# Patient Record
Sex: MALE | Race: BLACK OR AFRICAN AMERICAN | NOT HISPANIC OR LATINO | Employment: UNEMPLOYED | URBAN - METROPOLITAN AREA
[De-identification: names, ages, dates, MRNs, and addresses within clinical notes are randomized per-mention and may not be internally consistent; named-entity substitution may affect disease eponyms.]

---

## 2017-02-14 DIAGNOSIS — J45.40 MODERATE PERSISTENT ASTHMA WITHOUT COMPLICATION: ICD-10-CM

## 2017-02-16 DIAGNOSIS — J45.40 MODERATE PERSISTENT ASTHMA WITHOUT COMPLICATION: ICD-10-CM

## 2017-02-16 RX ORDER — ALBUTEROL SULFATE 90 UG/1
AEROSOL, METERED RESPIRATORY (INHALATION)
Qty: 2 INHALER | Refills: 3 | Status: SHIPPED | OUTPATIENT
Start: 2017-02-16 | End: 2017-04-26

## 2017-04-20 ENCOUNTER — OFFICE VISIT (OUTPATIENT)
Dept: FAMILY MEDICINE | Facility: CLINIC | Age: 52
End: 2017-04-20

## 2017-04-20 VITALS
WEIGHT: 208 LBS | DIASTOLIC BLOOD PRESSURE: 93 MMHG | HEART RATE: 56 BPM | SYSTOLIC BLOOD PRESSURE: 151 MMHG | OXYGEN SATURATION: 99 % | TEMPERATURE: 97.8 F | BODY MASS INDEX: 27.44 KG/M2

## 2017-04-20 DIAGNOSIS — M79.661 RIGHT CALF PAIN: ICD-10-CM

## 2017-04-20 DIAGNOSIS — M25.561 RIGHT KNEE PAIN, UNSPECIFIED CHRONICITY: Primary | ICD-10-CM

## 2017-04-20 RX ORDER — ACETAMINOPHEN 500 MG
1000 TABLET ORAL EVERY 6 HOURS PRN
Qty: 100 TABLET | Refills: 0 | Status: SHIPPED | OUTPATIENT
Start: 2017-04-20 | End: 2018-03-05

## 2017-04-20 RX ORDER — CYCLOBENZAPRINE HCL 10 MG
10 TABLET ORAL 2 TIMES DAILY PRN
Qty: 30 TABLET | Refills: 0 | Status: SHIPPED | OUTPATIENT
Start: 2017-04-20 | End: 2018-03-05

## 2017-04-20 NOTE — MR AVS SNAPSHOT
After Visit Summary   4/20/2017    Andriy Davila Jr.    MRN: [id number]           Patient Information     Date Of Birth          1965        Visit Information        Provider Department      4/20/2017 8:00 AM Mick Mcneal MD [facility]        Today's Diagnoses     Right knee pain, unspecified chronicity    -  1    Right calf pain          Care Instructions    ~ Recommend you to see a orthopedic specialist for your right knee and calf pain.     Referral for ( TEST )  :      Orthopedics  LOCATION/PLACE/Provider :    [facility]  [address] [place] [id number]  DATE & TIME :     May 1st, 2017 at 10:45am with Dr. Carbone   PHONE :     [phone]  FAX :     [phone]  ADDITIONAL INFORMATION :     NA  Appointment made by clinic staff/:    DOMINIQUE          Follow-ups after your visit        Additional Services     ORTHOPEDICS ADULT REFERRAL       Reason for Referral:  Right knee and calf pain without clear etiology for 1 month  Referral Location: [facility]: [phone]     needed: No  Language: English      ([facility] Only) Referral should be tracked (Yes/No)?  YES                  Who to contact     Please call your clinic at [phone] to:    Ask questions about your health    Make or cancel appointments    Discuss your medicines    Learn about your test results    Speak to your doctor   If you have compliments or concerns about an experience at your clinic, or if you wish to file a complaint, please contact [facility] Patient Relations at [phone] or email us at [email or web address]         Additional Information About Your Visit        Care EveryWhere ID     This is your Care EveryWhere ID. This could be used by other organizations to access your [place] medical records  [id number]        Your Vitals Were     Pulse Temperature Pulse Oximetry BMI (Body Mass Index)          56 97.8  F (36.6  C)  (Oral) 99% 27.44 kg/m2         Blood Pressure from Last 3 Encounters:   04/20/17 (!) 151/93   06/01/16 137/86   02/09/16 134/89    Weight from Last 3 Encounters:   04/20/17 208 lb (94.3 kg)   06/01/16 193 lb (87.5 kg)   02/09/16 197 lb 9.6 oz (89.6 kg)              We Performed the Following     ORTHOPEDICS ADULT REFERRAL          Today's Medication Changes          These changes are accurate as of: 4/20/17 10:28 AM.  If you have any questions, ask your nurse or doctor.               Start taking these medicines.        Dose/Directions    acetaminophen 500 MG tablet   Commonly known as:  TYLENOL   Used for:  Right knee pain, unspecified chronicity, Right calf pain   Started by:  Mick Mcneal MD        Dose:  1000 mg   Take 2 tablets (1,000 mg) by mouth every 6 hours as needed for mild pain   Quantity:  100 tablet   Refills:  0       cyclobenzaprine 10 MG tablet   Commonly known as:  FLEXERIL   Used for:  Right calf pain, Right knee pain, unspecified chronicity   Started by:  Mick Mcneal MD        Dose:  10 mg   Take 1 tablet (10 mg) by mouth 2 times daily as needed for muscle spasms   Quantity:  30 tablet   Refills:  0         Stop taking these medicines if you haven't already. Please contact your care team if you have questions.     buPROPion 100 MG 12 hr tablet   Commonly known as:  WELLBUTRIN SR   Stopped by:  Mick Mcneal MD           mirtazapine 15 MG tablet   Commonly known as:  REMERON   Stopped by:  Mick Mcneal MD           predniSONE 20 MG tablet   Commonly known as:  DELTASONE   Stopped by:  Mick Mcneal MD                Where to get your medicines      These medications were sent to [id number] - [place], [place] - [address] [place] [address] AT [address], [place] [address]     Phone:  [phone]     acetaminophen 500 MG tablet         Some of these will need a paper prescription and others can be bought over the counter.  Ask your nurse if you have  questions.     Bring a paper prescription for each of these medications     cyclobenzaprine 10 MG tablet                Primary Care Provider Office Phone # Fax #    Jay Strange [phone]       [facility] [address] [place] [address]        Thank you!     Thank you for choosing [facility]  for your care. Our goal is always to provide you with excellent care. Hearing back from our patients is one way we can continue to improve our services. Please take a few minutes to complete the written survey that you may receive in the mail after your visit with us. Thank you!             Your Updated Medication List - Protect others around you: Learn how to safely use, store and throw away your medicines at www.disposemymeds.org.          This list is accurate as of: 4/20/17 10:28 AM.  Always use your most recent med list.                   Brand Name Dispense Instructions for use    acetaminophen 500 MG tablet    TYLENOL    100 tablet    Take 2 tablets (1,000 mg) by mouth every 6 hours as needed for mild pain       albuterol 108 (90 BASE) MCG/ACT Inhaler    albuterol    2 Inhaler    Take 1-2 puffs       cetirizine 10 MG tablet    zyrTEC    30 tablet    Take 1 tablet (10 mg) by mouth daily       cyclobenzaprine 10 MG tablet    FLEXERIL    30 tablet    Take 1 tablet (10 mg) by mouth 2 times daily as needed for muscle spasms       fluticasone 50 MCG/ACT spray    FLONASE    16 g    Spray 2 sprays into both nostrils daily       fluticasone-salmeterol 250-50 MCG/DOSE diskus inhaler    ADVAIR    3 Inhaler    Inhale 1 puff into the lungs 2 times daily       olopatadine 0.1 % ophthalmic solution    PATANOL    5 mL    Place 1 drop into both eyes 2 times daily

## 2017-04-20 NOTE — PATIENT INSTRUCTIONS
~ Recommend you to see a orthopedic specialist for your right knee and calf pain.     Referral for ( TEST )  :      Orthopedics  LOCATION/PLACE/Provider :    [facility]  [address] [place] [address]  DATE & TIME :     May 1st, 2017 at 10:45am with Dr. Carbone   PHONE :     [phone]  FAX :     [phone]  ADDITIONAL INFORMATION :     NA  Appointment made by clinic staff/:    DOMINIQUE    6/12/17 f/u to [facility], pt no show appt on 5/1/17 and no future appt scheduled.  F/u to patient, voicemail only, letter sent. MARNIE Barclay

## 2017-04-20 NOTE — LETTER
June 12, 2017      Andriy Davila Jr  [address]  [place] [address]        Dear Andriy,    I am following up with you regarding your referral to see [facility] [phone].  Please call them to reschedule.  Let us know if there is anything we can assist you with.    Sincerely,    MD Juancho Soto ARCHANA

## 2017-04-20 NOTE — PROGRESS NOTES
SUBJECTIVE:  This is a 52-year-old male who I am meeting for the first time today.  He describes one month of his right knee and right calf pain.  No known injury.  He cannot recall when this exactly started.  Over the past month he has pain when he touches his calf or when he walks.  His knee pain is worse when he tries to bend his knee and he often finds himself holding the knee out in front of him flexed only about 20 degrees.  He has been walking and has not modified his activity other than holding his leg more extended when he is sitting.  He has been using ibuprofen 400 mg 4-5 times per day for the pain.  He has not had any redness, swelling, locking, popping or giving out.  He was hopeful that the pain will go away on its own.  He cannot recall having problems or pain in his right leg before.  Thinks he has had some left knee pain in the past.  In reviewing his chart he was here in 02/2016 with right knee pain and underwent plain film x-rays which were negative.  The provisional diagnosis at that time was possible meniscal injury and the recommendation was to return if his pain does not get better.  We reviewed this history and he assumes that the pain got better after that evaluation.   REVIEW OF SYSTEMS:  No fevers or chills.  No skin changes.  No calf swelling, no shortness of breath, no bruising or bleeding.   OBJECTIVE:   VITAL SIGNS:  As above, blood pressure is slightly above goal.   GENERAL:  He is alert and in no distress.   NEUROLOGIC:  He walks with an antalgic gait.    EXTREMITIES:  When he is sitting he holds his right leg mostly  extended flexed to only about 20-30 degrees.  He has normal muscle bulk and tone of the right lower extremity, he has normal sensation to light touch throughout the lower legs from the knee distally and there is no erythema or swelling of the knee, calf ankle or foot.  He has tenderness along both sides of the patellar tenderness to very light touch in the popliteal  fossa and all along the calf with some distraction the tenderness along the calf is variable.  He is able to dorsiflex and plantar flex the ankle with full range of motion and without pain.  He has pain with trying to fully extend the knee or trying to flex beyond 30 degrees.  He has pain with inversion or eversion of the foot up into the knee.  The left lower extremity is unremarkable.   ASSESSMENT AND PLAN:   1.  Right knee and calf pain:  This could be a meniscal injury with radiating pain versus a calf muscle injury, or achilles injury. This is not a common presentation of any of these conditions.  Deep venous thrombosis is very unlikely.  He has significant but variable tenderness to the calf but does not have pain at the achilles, and does not have pain with dorsiflexion, plantar flexion of the ankle, even against resistance.  At this point the diagnosis is unclear.  Certainly further imaging with MRI potentially could be helpful, but with unclear physical exam findings I think it may be most efficient to have a second opinion with the orthopedic surgeon/sports medicine group.  He has an established relationship with [facility] and has been seen for various musculoskeletal injuries in the past.  He will be evaluated there for a second opinion to see if imaging is necessary versus a therapy program or if a wait and watch approach would be reasonable.  He may use ibuprofen 600 mg up to 3 times a day with food, Tylenol 1000 mg up to 4 times a day and Flexeril 10 mg twice daily for no more than 14 days.  We discussed the potential side effects of these medications and in particular Flexeril, discussed the potential for sedation and not to drive on this medication and that he should not use this medication for more than 2 weeks.  Await second opinion for further recs.

## 2017-04-26 DIAGNOSIS — J45.40 MODERATE PERSISTENT ASTHMA WITHOUT COMPLICATION: ICD-10-CM

## 2017-04-26 DIAGNOSIS — Z91.09 ENVIRONMENTAL ALLERGIES: ICD-10-CM

## 2017-04-26 DIAGNOSIS — H10.13 ALLERGIC CONJUNCTIVITIS, BILATERAL: ICD-10-CM

## 2017-04-26 DIAGNOSIS — J30.2 ALLERGIC RHINITIS, SEASONAL: ICD-10-CM

## 2017-04-28 RX ORDER — FLUTICASONE PROPIONATE 50 MCG
2 SPRAY, SUSPENSION (ML) NASAL DAILY
Qty: 16 G | Refills: 3 | Status: SHIPPED | OUTPATIENT
Start: 2017-04-28 | End: 2021-12-08

## 2017-04-28 RX ORDER — ALBUTEROL SULFATE 90 UG/1
AEROSOL, METERED RESPIRATORY (INHALATION)
Qty: 2 INHALER | Refills: 1 | Status: SHIPPED | OUTPATIENT
Start: 2017-04-28 | End: 2018-03-05

## 2017-04-28 RX ORDER — OLOPATADINE HYDROCHLORIDE 1 MG/ML
1 SOLUTION/ DROPS OPHTHALMIC 2 TIMES DAILY
Qty: 5 ML | Refills: 3 | Status: SHIPPED | OUTPATIENT
Start: 2017-04-28 | End: 2021-12-08

## 2017-04-28 RX ORDER — CETIRIZINE HYDROCHLORIDE 10 MG/1
10 TABLET ORAL DAILY
Qty: 30 TABLET | Refills: 11 | Status: SHIPPED | OUTPATIENT
Start: 2017-04-28 | End: 2018-03-05

## 2018-03-05 ENCOUNTER — OFFICE VISIT (OUTPATIENT)
Dept: FAMILY MEDICINE | Facility: CLINIC | Age: 53
End: 2018-03-05
Payer: MEDICAID

## 2018-03-05 VITALS
WEIGHT: 217 LBS | HEIGHT: 74 IN | OXYGEN SATURATION: 100 % | DIASTOLIC BLOOD PRESSURE: 86 MMHG | SYSTOLIC BLOOD PRESSURE: 138 MMHG | BODY MASS INDEX: 27.85 KG/M2 | HEART RATE: 68 BPM | TEMPERATURE: 98 F

## 2018-03-05 DIAGNOSIS — L02.01 CUTANEOUS ABSCESS OF FACE: Primary | ICD-10-CM

## 2018-03-05 DIAGNOSIS — J45.40 MODERATE PERSISTENT ASTHMA WITHOUT COMPLICATION: ICD-10-CM

## 2018-03-05 DIAGNOSIS — N52.9 ERECTILE DYSFUNCTION, UNSPECIFIED ERECTILE DYSFUNCTION TYPE: ICD-10-CM

## 2018-03-05 DIAGNOSIS — Z91.09 ENVIRONMENTAL ALLERGIES: ICD-10-CM

## 2018-03-05 DIAGNOSIS — M25.561 RIGHT KNEE PAIN, UNSPECIFIED CHRONICITY: ICD-10-CM

## 2018-03-05 DIAGNOSIS — H10.13 ALLERGIC CONJUNCTIVITIS, BILATERAL: ICD-10-CM

## 2018-03-05 RX ORDER — OLOPATADINE HYDROCHLORIDE 1 MG/ML
1 SOLUTION/ DROPS OPHTHALMIC 2 TIMES DAILY
Qty: 5 ML | Refills: 3 | Status: CANCELLED | OUTPATIENT
Start: 2018-03-05

## 2018-03-05 RX ORDER — CEPHALEXIN 500 MG/1
500 CAPSULE ORAL 2 TIMES DAILY
Qty: 14 CAPSULE | Refills: 0 | Status: SHIPPED | OUTPATIENT
Start: 2018-03-05 | End: 2018-03-12

## 2018-03-05 RX ORDER — ACETAMINOPHEN 500 MG
1000 TABLET ORAL EVERY 6 HOURS PRN
Qty: 100 TABLET | Refills: 0 | Status: SHIPPED | OUTPATIENT
Start: 2018-03-05 | End: 2018-06-18

## 2018-03-05 RX ORDER — ALBUTEROL SULFATE 90 UG/1
AEROSOL, METERED RESPIRATORY (INHALATION)
Qty: 2 INHALER | Refills: 1 | Status: SHIPPED | OUTPATIENT
Start: 2018-03-05 | End: 2021-12-08

## 2018-03-05 RX ORDER — SILDENAFIL 50 MG/1
50 TABLET, FILM COATED ORAL DAILY PRN
Qty: 6 TABLET | Refills: 0 | Status: SHIPPED | OUTPATIENT
Start: 2018-03-05 | End: 2021-12-08

## 2018-03-05 RX ORDER — CETIRIZINE HYDROCHLORIDE 10 MG/1
10 TABLET ORAL DAILY
Qty: 30 TABLET | Refills: 11 | Status: SHIPPED | OUTPATIENT
Start: 2018-03-05 | End: 2021-12-08

## 2018-03-05 RX ORDER — FLUTICASONE PROPIONATE 50 MCG
2 SPRAY, SUSPENSION (ML) NASAL DAILY
Qty: 16 G | Refills: 3 | Status: CANCELLED | OUTPATIENT
Start: 2018-03-05

## 2018-03-05 NOTE — MR AVS SNAPSHOT
"              After Visit Summary   3/5/2018    Andriy Davila Jr.    MRN: [id number]           Patient Information     Date Of Birth          1965        Visit Information        Provider Department      3/5/2018 9:40 AM Mick Mcneal MD [facility]        Today's Diagnoses     Cutaneous abscess of face    -  1    Moderate persistent asthma without complication        Environmental allergies        Allergic conjunctivitis, bilateral        Right knee pain, unspecified chronicity        Erectile dysfunction, unspecified erectile dysfunction type           Follow-ups after your visit        Who to contact     Please call your clinic at [phone] to:    Ask questions about your health    Make or cancel appointments    Discuss your medicines    Learn about your test results    Speak to your doctor            Additional Information About Your Visit        Care EveryWhere ID     This is your Care EveryWhere ID. This could be used by other organizations to access your [place] medical records  [id number]        Your Vitals Were     Pulse Temperature Height Pulse Oximetry BMI (Body Mass Index)       68 98  F (36.7  C) (Oral) 6' 2\" (188 cm) 100% 27.86 kg/m2        Blood Pressure from Last 3 Encounters:   03/05/18 138/86   04/20/17 (!) 151/93   06/01/16 137/86    Weight from Last 3 Encounters:   03/05/18 217 lb (98.4 kg)   04/20/17 208 lb (94.3 kg)   06/01/16 193 lb (87.5 kg)              We Performed the Following     DRAIN SKIN ABSCESS SIMPLE/SINGLE          Today's Medication Changes          These changes are accurate as of 3/5/18 10:38 AM.  If you have any questions, ask your nurse or doctor.               Start taking these medicines.        Dose/Directions    cephALEXin 500 MG capsule   Commonly known as:  KEFLEX   Used for:  Cutaneous abscess of face   Started by:  Mick Mcneal MD        Dose:  500 mg   Take 1 capsule (500 mg) by mouth 2 times daily for 7 days   Quantity:  14 capsule   Refills:  " 0       sildenafil 50 MG tablet   Commonly known as:  VIAGRA   Used for:  Erectile dysfunction, unspecified erectile dysfunction type   Started by:  Mick Mcneal MD        Dose:  50 mg   Take 1 tablet (50 mg) by mouth daily as needed 30 min to 4 hrs before sex. Do not use with nitroglycerin, terazosin or doxazosin.   Quantity:  6 tablet   Refills:  0         Stop taking these medicines if you haven't already. Please contact your care team if you have questions.     cyclobenzaprine 10 MG tablet   Commonly known as:  FLEXERIL   Stopped by:  Mick Mcneal MD                Where to get your medicines      These medications were sent to [id number] - [place], [place] - [address], [place] [address]     Phone:  [phone]     acetaminophen 500 MG tablet    albuterol 108 (90 BASE) MCG/ACT Inhaler    cephALEXin 500 MG capsule    cetirizine 10 MG tablet    fluticasone-salmeterol 250-50 MCG/DOSE diskus inhaler         Some of these will need a paper prescription and others can be bought over the counter.  Ask your nurse if you have questions.     Bring a paper prescription for each of these medications     sildenafil 50 MG tablet                Primary Care Provider Office Phone # Fax #    Marcus Ryan Pollock [phone]       [address] [address]        Equal Access to Services     CHINEDU BENTON AH: Hadii aad ku hadasho Sosandi, waaxda luqadaha, qaybta kaalmada adeegyada, mat bangura . So [facility] [phone].    ATENCIÓN: Si habla español, tiene a fernandes disposición servicios gratuitos de asistencia lingüística. Radha schaeffer [phone].    We comply with applicable federal civil rights laws and [place] laws. We do not discriminate on the basis of race, color, national origin, age, disability, sex, sexual orientation, or gender identity.            Thank you!      Thank you for choosing [facility]  for your care. Our goal is always to provide you with excellent care. Hearing back from our patients is one way we can continue to improve our services. Please take a few minutes to complete the written survey that you may receive in the mail after your visit with us. Thank you!             Your Updated Medication List - Protect others around you: Learn how to safely use, store and throw away your medicines at www.disposemymeds.org.          This list is accurate as of 3/5/18 10:38 AM.  Always use your most recent med list.                   Brand Name Dispense Instructions for use Diagnosis    acetaminophen 500 MG tablet    TYLENOL    100 tablet    Take 2 tablets (1,000 mg) by mouth every 6 hours as needed for mild pain    Right knee pain, unspecified chronicity       albuterol 108 (90 BASE) MCG/ACT Inhaler    PROAIR HFA    2 Inhaler    Take 1-2 puffs    Moderate persistent asthma without complication       cephALEXin 500 MG capsule    KEFLEX    14 capsule    Take 1 capsule (500 mg) by mouth 2 times daily for 7 days    Cutaneous abscess of face       cetirizine 10 MG tablet    zyrTEC    30 tablet    Take 1 tablet (10 mg) by mouth daily    Environmental allergies       fluticasone 50 MCG/ACT spray    FLONASE    16 g    Spray 2 sprays into both nostrils daily    Moderate persistent asthma without complication, Environmental allergies       fluticasone-salmeterol 250-50 MCG/DOSE diskus inhaler    ADVAIR    60 Inhaler    Inhale 1 puff into the lungs 2 times daily    Moderate persistent asthma without complication       olopatadine 0.1 % ophthalmic solution    PATANOL    5 mL    Place 1 drop into both eyes 2 times daily    Allergic conjunctivitis, bilateral       sildenafil 50 MG tablet    VIAGRA    6 tablet    Take 1 tablet (50 mg) by mouth daily as needed 30 min to 4 hrs before sex. Do not use with nitroglycerin, terazosin or doxazosin.    Erectile dysfunction,  unspecified erectile dysfunction type

## 2018-03-06 NOTE — PROGRESS NOTES
SUBJECTIVE:  This is a 52-year-old male who I am meeting for the first time today.  He presents with multiple issues.   First, he has had a swelling on his left facial cheek for the past 3 days.  He noticed a scant amount of drainage yesterday prompting his visit.  The swollen area of his face is painful.  He denies any fevers or chills.  He has not had this problem before.  He does shave regularly.     He has had difficulty with erections for the past few years.  He has difficulty with the firmness of his erections and getting erections and lasting erections.  He has not tried any medications in the past.  He has never been a smoker.  He has never been diagnosed with high blood pressure, although his blood pressure has been recorded above goal.  He does not take blood pressure medications.     He has asthma.  He has not recently had any wheezing or cough.  He finds Advair a helpful medication.     He has intermittent runny nose which he attributes to allergies.  This has been better with the nasal steroid.  He has intermittent dry eyes at the time of allergies and uses intermittent eye drops.   REVIEW OF SYSTEMS:  No fevers or chills, no skin lesions other than on his face.  No vision changes, no polyuria or polydipsia.  His weight has been stable.  His mood, PHQ-2 is negative.  No shortness of breath, wheezing or cough.  He has intermittent right knee pain, no difficulty with urination.  The remainder of review of systems as outlined above.   PHYSICAL EXAMINATION:   VITAL SIGNS:  As above.   GENERAL:  He is alert, no distress.   HEENT:  Head is normocephalic and atraumatic.  Eyes, sclerae are nonicteric, noninjected.  Moist mucous membranes.  Examination of the left facial cheek shows an approximately 2 cm x 1 cm oval area of fluctuance.  There is a small skin break with yellow discharge.  There is no surrounding erythema.  Eyes, sclerae are nonicteric.  Pupils equal, round, reactive to light.   NECK:  Free of  adenopathy.  Tympanic membranes are white with normal bony and light landmarks.  No tonsillar hypertrophy or exudate.   CARDIOVASCULAR:  Regular rate and rhythm without murmur.   LUNGS:  Clear to auscultation bilaterally.   ABDOMEN:  Soft, nontender.   EXTREMITIES:  Examination of the right knee shows no effusions.   ASSESSMENT AND PLAN:   1.  Skin abscess left cheek facial cheek  versus is infected sebaceous cyst:  Due to the discomfort and the size of the swelling I recommend an  incision and drainage.      PROCEDURE NOTE:We discussed the risk of bleeding, infection, scarring and nerve injury.  He consented to the procedure and signed a consent form.  We did a pause for the cause.  PROCEDURE:  The skin was prepped with alcohol and Betadine.  A 30 gauge needle was used to anesthetize the area of the face with 2 cc of 1% lidocaine.  An 11 blade scalpel was used to make 0.5 cm incision and thick gray and yellow material was evacuated from the area.  He had immediate relief of discomfort.  The skin was prepped with a gauze bandage.      Recommend 7 days of Keflex and he should return immediately if he develops any redness, pain and re-accumulation of fluid, fevers or other new concerns.   2.  Erectile dysfunction:  He may use Viagra 50 mg as needed.  We discussed the risks of vision loss, hearing loss, flushing, headache and orthostatic hypotension and persistent erection with penile damage.  He had his questions answered and plans to try the medication.  I recommended starting with 25 mg and moving up to 50 mg if needed.  He should not mix the medication with alcohol, and caution with standing.   3.  Moderate persistent asthma:  Continue current medication management with Advair and intermittent albuterol.   4.  Allergic conjunctivitis:  Eye drops as needed.   5.  Allergic rhinitis:  A nasal steroid refilled today.

## 2018-06-18 DIAGNOSIS — M25.561 RIGHT KNEE PAIN, UNSPECIFIED CHRONICITY: ICD-10-CM

## 2018-06-18 RX ORDER — ACETAMINOPHEN 500 MG
1000 TABLET ORAL EVERY 6 HOURS PRN
Qty: 100 TABLET | Refills: 1 | Status: SHIPPED | OUTPATIENT
Start: 2018-06-18

## 2019-05-20 DIAGNOSIS — J45.40 MODERATE PERSISTENT ASTHMA WITHOUT COMPLICATION: ICD-10-CM

## 2019-05-20 NOTE — TELEPHONE ENCOUNTER
Attempted call but no answer. Left VM for pt to return call to inform him of recommendation below by MD. --Andres, Marcus Osborne MD  Pv Red Team 2 hours ago (1:34 PM)      PLease call patient and have him make an appointment for his asthma in clinic. THank You

## 2021-12-08 ENCOUNTER — OFFICE VISIT (OUTPATIENT)
Dept: FAMILY MEDICINE | Facility: CLINIC | Age: 56
End: 2021-12-08
Payer: MEDICAID

## 2021-12-08 VITALS
WEIGHT: 207 LBS | SYSTOLIC BLOOD PRESSURE: 147 MMHG | TEMPERATURE: 98 F | RESPIRATION RATE: 18 BRPM | HEART RATE: 70 BPM | BODY MASS INDEX: 26.56 KG/M2 | DIASTOLIC BLOOD PRESSURE: 92 MMHG | HEIGHT: 74 IN | OXYGEN SATURATION: 99 %

## 2021-12-08 DIAGNOSIS — Z00.00 ENCOUNTER FOR MEDICAL EXAMINATION TO ESTABLISH CARE: ICD-10-CM

## 2021-12-08 DIAGNOSIS — Z12.11 COLON CANCER SCREENING: ICD-10-CM

## 2021-12-08 DIAGNOSIS — Z91.09 ENVIRONMENTAL ALLERGIES: ICD-10-CM

## 2021-12-08 DIAGNOSIS — J45.40 MODERATE PERSISTENT ASTHMA WITHOUT COMPLICATION: ICD-10-CM

## 2021-12-08 DIAGNOSIS — I10 BENIGN ESSENTIAL HYPERTENSION: Primary | ICD-10-CM

## 2021-12-08 LAB
ANION GAP SERPL CALCULATED.3IONS-SCNC: 8 MMOL/L (ref 5–18)
BUN SERPL-MCNC: 15 MG/DL (ref 8–22)
CALCIUM SERPL-MCNC: 9.7 MG/DL (ref 8.5–10.5)
CHLORIDE BLD-SCNC: 106 MMOL/L (ref 98–107)
CHOLEST SERPL-MCNC: 160 MG/DL
CO2 SERPL-SCNC: 24 MMOL/L (ref 22–31)
CREAT SERPL-MCNC: 1.06 MG/DL (ref 0.7–1.3)
FASTING STATUS PATIENT QL REPORTED: NO
GFR SERPL CREATININE-BSD FRML MDRD: 78 ML/MIN/1.73M2
GLUCOSE BLD-MCNC: 104 MG/DL (ref 70–125)
HDLC SERPL-MCNC: 51 MG/DL
LDLC SERPL CALC-MCNC: 88 MG/DL
POTASSIUM BLD-SCNC: 4.2 MMOL/L (ref 3.5–5)
SODIUM SERPL-SCNC: 138 MMOL/L (ref 136–145)
TRIGL SERPL-MCNC: 106 MG/DL

## 2021-12-08 PROCEDURE — 80061 LIPID PANEL: CPT | Performed by: STUDENT IN AN ORGANIZED HEALTH CARE EDUCATION/TRAINING PROGRAM

## 2021-12-08 PROCEDURE — 36415 COLL VENOUS BLD VENIPUNCTURE: CPT | Performed by: STUDENT IN AN ORGANIZED HEALTH CARE EDUCATION/TRAINING PROGRAM

## 2021-12-08 PROCEDURE — 80048 BASIC METABOLIC PNL TOTAL CA: CPT | Performed by: STUDENT IN AN ORGANIZED HEALTH CARE EDUCATION/TRAINING PROGRAM

## 2021-12-08 PROCEDURE — 99204 OFFICE O/P NEW MOD 45 MIN: CPT | Mod: GC | Performed by: STUDENT IN AN ORGANIZED HEALTH CARE EDUCATION/TRAINING PROGRAM

## 2021-12-08 RX ORDER — FLUTICASONE PROPIONATE 50 MCG
2 SPRAY, SUSPENSION (ML) NASAL DAILY
Qty: 16 G | Refills: 3 | Status: SHIPPED | OUTPATIENT
Start: 2021-12-08

## 2021-12-08 RX ORDER — LOSARTAN POTASSIUM 25 MG/1
25 TABLET ORAL DAILY
Qty: 30 TABLET | Refills: 11 | Status: SHIPPED | OUTPATIENT
Start: 2021-12-08 | End: 2022-12-09

## 2021-12-08 RX ORDER — CETIRIZINE HYDROCHLORIDE 10 MG/1
10 TABLET ORAL DAILY
Qty: 90 TABLET | Refills: 3 | Status: SHIPPED | OUTPATIENT
Start: 2021-12-08

## 2021-12-08 RX ORDER — BUDESONIDE AND FORMOTEROL FUMARATE DIHYDRATE 80; 4.5 UG/1; UG/1
AEROSOL RESPIRATORY (INHALATION)
Qty: 20.4 G | Refills: 3 | Status: SHIPPED | OUTPATIENT
Start: 2021-12-08

## 2021-12-08 ASSESSMENT — ASTHMA QUESTIONNAIRES
QUESTION_2 LAST FOUR WEEKS HOW OFTEN HAVE YOU HAD SHORTNESS OF BREATH: MORE THAN ONCE A DAY
ACT_TOTALSCORE: 10
QUESTION_3 LAST FOUR WEEKS HOW OFTEN DID YOUR ASTHMA SYMPTOMS (WHEEZING, COUGHING, SHORTNESS OF BREATH, CHEST TIGHTNESS OR PAIN) WAKE YOU UP AT NIGHT OR EARLIER THAN USUAL IN THE MORNING: TWO OR THREE NIGHTS A WEEK
QUESTION_1 LAST FOUR WEEKS HOW MUCH OF THE TIME DID YOUR ASTHMA KEEP YOU FROM GETTING AS MUCH DONE AT WORK, SCHOOL OR AT HOME: SOME OF THE TIME
QUESTION_4 LAST FOUR WEEKS HOW OFTEN HAVE YOU USED YOUR RESCUE INHALER OR NEBULIZER MEDICATION (SUCH AS ALBUTEROL): THREE OR MORE TIMES PER DAY
QUESTION_5 LAST FOUR WEEKS HOW WOULD YOU RATE YOUR ASTHMA CONTROL: SOMEWHAT CONTROLLED

## 2021-12-08 ASSESSMENT — MIFFLIN-ST. JEOR: SCORE: 1827.7

## 2021-12-08 NOTE — PROGRESS NOTES
Preceptor Attestation:  Patient's case reviewed and discussed with Mikey Catalan MD resident and I evaluated the patient. I agree with written assessment and plan of care.  Supervising Physician:  Forrest Cormier MD, MD [facility]

## 2021-12-08 NOTE — PROGRESS NOTES
CHIEF COMPLAINT                                                      Chief Complaint   Patient presents with     [facility]     reestablishing care     Hypertension     check bp     Medication Reconciliation     need attention     Forms     disability form     SUBJECTIVE:                                                    Andriy Davila Jr. is a 56 year old year old male who presents to clinic today for the following health issues:    Moved back from [place]  -Had been living with daughter   -Was last seen ~5 weeks ago   -Was diagnosed with hypertension about two months ago   -Had been taking Losartan 25 mg, ran out about a week and a half ago   -No side effects from taking the medication   -Would like a refill    Asthma  Rescue inhaler  -Advair daily  -Rescue albuterol, currently taking several times each day  -Rescue inhaler in his pocket  -Nebulizer whenever he needs it  -Needed steroids about a month and a half ago (monthe before he came here)  -Triggers include seasonal, air pollution, smoke, dust, sometimes the cold helps, sometimes it makes it worse, etc    Substance use  -Denies any tobacco use  -Denies any mariajuana or illicit substance use  -Does not drink alcohol regularly    Safety  -Feels safe where he is living  -Denies any history of being unsafe    Sexual history  -Sexually active with women  -Not      Employment history  -Works on computers currently, 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  ----------------------------------------------------------------------------------------------------------------------  Patient Active Problem List   Diagnosis     Health Care Home     Asthma     Lumbago     Pain of right upper arm     Allergic rhinitis, seasonal     Environmental allergies     Elevated blood pressure reading without diagnosis of hypertension     Generalized osteoarthrosis, unspecified site     Past Surgical History:   Procedure Laterality Date     NO  "HISTORY OF SURGERY         Social History     Tobacco Use     Smoking status: Passive Smoke Exposure - Never Smoker     Smokeless tobacco: Never Used   Substance Use Topics     Alcohol use: Yes     Alcohol/week: 6.0 standard drinks     Types: 6 Cans of beer per week     Family History   Problem Relation Age of Onset     Diabetes Mother      Hypertension Mother      Breast Cancer No family hx of      Cancer - colorectal No family hx of      Ovarian Cancer No family hx of      Cerebrovascular Disease No family hx of      Asthma No family hx of          Problem list and past medical, surgical, social, and family histories reviewed & adjusted, as indicated.    Current Outpatient Medications   Medication Sig Dispense Refill     budesonide-formoterol (SYMBICORT) 80-4.5 MCG/ACT Inhaler Use two puffs twice daily and then use one inhalation as needed up to 8 additional times each day for control 20.4 g 3     cetirizine (ZYRTEC) 10 MG tablet Take 1 tablet (10 mg) by mouth daily 90 tablet 3     fluticasone (FLONASE) 50 MCG/ACT nasal spray Spray 2 sprays into both nostrils daily 16 g 3     losartan (COZAAR) 25 MG tablet Take 1 tablet (25 mg) by mouth daily 30 tablet 11     acetaminophen (TYLENOL) 500 MG tablet Take 2 tablets (1,000 mg) by mouth every 6 hours as needed for mild pain 100 tablet 1     Medication list reviewed and updated as indicated.    Allergies   Allergen Reactions     Shellfish-Derived Products Anaphylaxis     Seasonal Allergies      Allergies reviewed and updated as indicated.  ----------------------------------------------------------------------------------------------------------------------  ROS:  Constitutional, HEENT, cardiovascular, pulmonary, GI, musculoskeletal, neuro, skin, and psych systems are negative, except as otherwise noted.    OBJECTIVE:     BP (!) 147/92 (BP Location: Right arm)   Pulse 70   Temp 98  F (36.7  C) (Oral)   Resp 18   Ht 1.87 m (6' 1.62\")   Wt 93.9 kg (207 lb)   SpO2 99%  "  BMI 26.85 kg/m    Body mass index is 26.85 kg/m .  Exam:  Constitutional: healthy, alert and no distress  Head: Normocephalic. No masses, lesions, tenderness or abnormalities  Neck: Neck supple. No adenopathy. Thyroid symmetric, normal size,, Carotids without bruits.  ENT: ENT exam normal, no neck nodes or sinus tenderness  Cardiovascular: negative, PMI normal. No lifts, heaves, or thrills. RRR. No murmurs, clicks gallops or rub  Respiratory: negative, Percussion normal. Good diaphragmatic excursion. Lungs clear  Gastrointestinal: Abdomen soft, non-tender. BS normal. No masses, organomegaly  Musculoskeletal: extremities normal- no gross deformities noted, gait normal and normal muscle tone  Skin: no suspicious lesions or rashes  Neurologic: Gait normal. Reflexes normal and symmetric. Sensation grossly WNL.  Psychiatric: mentation appears normal and affect normal/bright  Hematologic/Lymphatic/Immunologic: Normal cervical lymph nodes    Diagnostic Test Results:  BMP and Lipid panel pending    ASSESSMENT/PLAN:     (Z00.00) Encounter for medical examination to establish care   (I10) Benign essential hypertension    -Patient needs refill of medication  -States he was on Losartan 25 mg, ran out about a week and a half ago  -BP is mildly elevated today, will restart at 25 mg  -Plan to follow up in ~ 2 weeks for asthma and blood pressure control    (J45.40) Moderate persistent asthma without complication  -Patient has an ACT score of 10 today  -Last was on steroids ~2 months ago  -Uses albuterol typically every couple of days  -Discussed optimizing care, per AMOL guidelines will use SMART therapy and switch to Symbicort   -Plan to follow up in two weeks    (Z91.09) Environmental allergies  -Will refill flonase and zyrtec    (Z12.11) Colon cancer screening  -Overdue for screening, has never had  -Referral sent     Encouraged patient to schedule     Medications Discontinued During This Encounter   Medication Reason      fluticasone (FLONASE) 50 MCG/ACT spray Reorder     cetirizine (ZYRTEC) 10 MG tablet Reorder     olopatadine (PATANOL) 0.1 % ophthalmic solution      albuterol (PROAIR HFA) 108 (90 BASE) MCG/ACT Inhaler      sildenafil (VIAGRA) 50 MG tablet      fluticasone-salmeterol (ADVAIR) 250-50 MCG/DOSE inhaler        Options for treatment and follow-up care were reviewed with the patient and/or guardian. Andriy Davila Jr. and/or guardian engaged in the decision making process and verbalized understanding of the options discussed and agreed with the final plan    Precepted with Dr. Cormier.    Mikey Catalan MD on 12/8/2021 at 8:15 AM

## 2021-12-09 ASSESSMENT — ASTHMA QUESTIONNAIRES: ACT_TOTALSCORE: 10

## 2021-12-16 NOTE — PATIENT INSTRUCTIONS
Referral for :     Gastroenterology    LOCATION/PLACE/Provider :    [facility]  DATE & TIME :    Faxed order to location, they will contact patient   PHONE :     [phone]  FAX :    [phone]  Appointment made by clinic staff/:    Jeanette

## 2022-03-21 ENCOUNTER — OFFICE VISIT (OUTPATIENT)
Dept: FAMILY MEDICINE | Facility: CLINIC | Age: 57
End: 2022-03-21
Payer: COMMERCIAL

## 2022-03-21 VITALS
TEMPERATURE: 98.1 F | BODY MASS INDEX: 26.69 KG/M2 | HEIGHT: 74 IN | SYSTOLIC BLOOD PRESSURE: 164 MMHG | DIASTOLIC BLOOD PRESSURE: 98 MMHG | OXYGEN SATURATION: 98 % | WEIGHT: 208 LBS | HEART RATE: 56 BPM

## 2022-03-21 DIAGNOSIS — L30.0 NUMMULAR ECZEMA: ICD-10-CM

## 2022-03-21 DIAGNOSIS — M54.50 CHRONIC BILATERAL LOW BACK PAIN WITHOUT SCIATICA: Primary | ICD-10-CM

## 2022-03-21 DIAGNOSIS — R21 RASH AND NONSPECIFIC SKIN ERUPTION: ICD-10-CM

## 2022-03-21 DIAGNOSIS — G89.29 CHRONIC BILATERAL LOW BACK PAIN WITHOUT SCIATICA: Primary | ICD-10-CM

## 2022-03-21 PROCEDURE — 99214 OFFICE O/P EST MOD 30 MIN: CPT | Mod: GC | Performed by: STUDENT IN AN ORGANIZED HEALTH CARE EDUCATION/TRAINING PROGRAM

## 2022-03-21 RX ORDER — TRIAMCINOLONE ACETONIDE 5 MG/G
OINTMENT TOPICAL
Qty: 30 G | Refills: 0 | Status: SHIPPED | OUTPATIENT
Start: 2022-03-21 | End: 2022-12-09

## 2022-03-21 RX ORDER — MINERAL OIL/HYDROPHIL PETROLAT
OINTMENT (GRAM) TOPICAL PRN
Qty: 420 G | Refills: 0 | Status: SHIPPED | OUTPATIENT
Start: 2022-03-21

## 2022-03-21 RX ORDER — GABAPENTIN 100 MG/1
100 CAPSULE ORAL 3 TIMES DAILY
Qty: 90 CAPSULE | Refills: 0 | Status: SHIPPED | OUTPATIENT
Start: 2022-03-21 | End: 2023-08-25

## 2022-03-21 RX ORDER — DIPHENHYDRAMINE HCL 25 MG
25 TABLET ORAL
Qty: 90 TABLET | Refills: 0 | Status: SHIPPED | OUTPATIENT
Start: 2022-03-21

## 2022-03-21 NOTE — NURSING NOTE
"56 year old  Chief Complaint   Patient presents with     Recheck Medication     refill meds      Derm Problem     rash on bilateral legs x 4-5 mons since Noovember        Blood pressure (!) 164/98, pulse 56, temperature 98.1  F (36.7  C), temperature source Oral, height 1.87 m (6' 1.62\"), weight 94.3 kg (208 lb), SpO2 98 %. Body mass index is 26.98 kg/m .  Patient Active Problem List   Diagnosis     Health Care Home     Asthma     Lumbago     Pain of right upper arm     Allergic rhinitis, seasonal     Environmental allergies     Elevated blood pressure reading without diagnosis of hypertension     Generalized osteoarthrosis, unspecified site       Wt Readings from Last 2 Encounters:   03/21/22 94.3 kg (208 lb)   12/08/21 93.9 kg (207 lb)     BP Readings from Last 3 Encounters:   03/21/22 (!) 164/98   12/08/21 (!) 147/92   03/05/18 138/86         Current Outpatient Medications   Medication     acetaminophen (TYLENOL) 500 MG tablet     budesonide-formoterol (SYMBICORT) 80-4.5 MCG/ACT Inhaler     cetirizine (ZYRTEC) 10 MG tablet     fluticasone (FLONASE) 50 MCG/ACT nasal spray     losartan (COZAAR) 25 MG tablet     No current facility-administered medications for this visit.       Social History     Tobacco Use     Smoking status: Passive Smoke Exposure - Never Smoker     Smokeless tobacco: Never Used   Substance Use Topics     Alcohol use: Yes     Alcohol/week: 6.0 standard drinks     Types: 6 Cans of beer per week     Drug use: No       Health Maintenance Due   Topic Date Due     PREVENTIVE CARE VISIT  Never done     SPIROMETRY  Never done     ADVANCE CARE PLANNING  Never done     COVID-19 Vaccine (1) Never done     COLORECTAL CANCER SCREENING  Never done     HIV SCREENING  Never done     HEPATITIS C SCREENING  Never done     ZOSTER IMMUNIZATION (1 of 2) Never done     ASTHMA ACTION PLAN  08/26/2014     PHQ-9  08/09/2016     INFLUENZA VACCINE (1) 09/01/2021     DTAP/TDAP/TD IMMUNIZATION (3 - Td or Tdap) 12/05/2021 "     PHQ-2 (once per calendar year)  01/01/2022       No results found for: PAP      March 21, 2022 2:26 PM

## 2022-03-21 NOTE — PROGRESS NOTES
CHIEF COMPLAINT                                                      Chief Complaint   Patient presents with     Recheck Medication     refill meds      Derm Problem     rash on bilateral legs x 4-5 mons since [place]      SUBJECTIVE:                                                    Andriy Davila . is a 56 year old year old male who presents to clinic today for the following health issues:    Rash:  -Patient has had a rash on his legs intermittently since last September   -Has been pruritic  -Dry skin has not helped with this  -Has started to spread to his trunk, back and arms   -Had seen a provider several months ago and was started on topical triamcinolone  -This did not help his symptoms, and he only used it BID for two weeks  -Also used Vaseline at that time  -Since stopping the topical steroids the rash has gotten worse  -Denies any new exposures, new cologne, new laundry detergents, change in diet, new medications, or other changes  -Does use scented laundry detergents  -Thinks this may have all started from some  Mosquito bites    Chronic back pain:  -Pain comes and goes  -Has had it for many years  -Not acutely different, but patient would like to restart some gabapentin that he had been on  -Cannot recall the dose  -Has also undergone PT for this in the past to some improvement but he has not done this since 2017 and he does not do regular exercises.    -Has also gotten some steroid injections in the back to good relief, he believes his last was 2018  -Chronic burning pain is located in the low back and will radiate down the legs   -Does not shoot  -Moving makes it worse, resting will improve the discomfort   -Only brings it up today with the hopes of restarting gabapentin    Patient is an established patient of this clinic.    ----------------------------------------------------------------------------------------------------------------------  Patient Active Problem List   Diagnosis     Health Care  Home     Asthma     Lumbago     Pain of right upper arm     Allergic rhinitis, seasonal     Environmental allergies     Elevated blood pressure reading without diagnosis of hypertension     Generalized osteoarthrosis, unspecified site     Past Surgical History:   Procedure Laterality Date     NO HISTORY OF SURGERY         Social History     Tobacco Use     Smoking status: Passive Smoke Exposure - Never Smoker     Smokeless tobacco: Never Used   Substance Use Topics     Alcohol use: Yes     Alcohol/week: 6.0 standard drinks     Types: 6 Cans of beer per week     Family History   Problem Relation Age of Onset     Diabetes Mother      Hypertension Mother      Breast Cancer No family hx of      Cancer - colorectal No family hx of      Ovarian Cancer No family hx of      Cerebrovascular Disease No family hx of      Asthma No family hx of          Problem list and past medical, surgical, social, and family histories reviewed & adjusted, as indicated.    Current Outpatient Medications   Medication Sig Dispense Refill     acetaminophen (TYLENOL) 500 MG tablet Take 2 tablets (1,000 mg) by mouth every 6 hours as needed for mild pain 100 tablet 1     budesonide-formoterol (SYMBICORT) 80-4.5 MCG/ACT Inhaler Use two puffs twice daily and then use one inhalation as needed up to 8 additional times each day for control 20.4 g 3     cetirizine (ZYRTEC) 10 MG tablet Take 1 tablet (10 mg) by mouth daily 90 tablet 3     fluticasone (FLONASE) 50 MCG/ACT nasal spray Spray 2 sprays into both nostrils daily 16 g 3     losartan (COZAAR) 25 MG tablet Take 1 tablet (25 mg) by mouth daily 30 tablet 11     Medication list reviewed and updated as indicated.    Allergies   Allergen Reactions     Shellfish-Derived Products Anaphylaxis     Seasonal Allergies      Allergies reviewed and updated as indicated.  ----------------------------------------------------------------------------------------------------------------------  ROS:  Constitutional,  "HEENT, cardiovascular, pulmonary, GI, musculoskeletal, neuro, skin, and psych systems are negative, except as otherwise noted.    OBJECTIVE:     BP (!) 164/98 (BP Location: Left arm, Patient Position: Sitting, Cuff Size: Adult Large)   Pulse 56   Temp 98.1  F (36.7  C) (Oral)   Ht 1.87 m (6' 1.62\")   Wt 94.3 kg (208 lb)   SpO2 98%   BMI 26.98 kg/m    Body mass index is 26.98 kg/m .  Exam:  Constitutional: healthy, alert and no distress  Head: Normocephalic. No masses, lesions, tenderness or abnormalities  Cardiovascular: negative, PMI normal. No lifts, heaves, or thrills. RRR. No murmurs, clicks gallops or rub  Respiratory: negative, Percussion normal. Good diaphragmatic excursion. Lungs clear  Gastrointestinal: Abdomen soft, non-tender. BS normal. No masses, organomegaly  Musculoskeletal: extremities normal- no gross deformities noted, gait normal and normal muscle tone  Skin: Several raised, well demarcated 2-3 cm slightly hyperpigmented lesions noted on the anterior calves and thighs, minimal excoriation noted without significant crusting on the legs.  On the chest, abdomen at the belt line, back left shoulder and arms where the shirt covers, there are small <1 cm hyperpigmented areas with defined boarders and some excoriation around them.   Neurologic: Gait normal.   Psychiatric: mentation appears normal and affect normal/bright    ASSESSMENT/PLAN:     (M54.50,  G89.29) Chronic bilateral low back pain without sciatica  (primary encounter diagnosis)  -Patient has known chronic low back pain, it is not acutely different   -I do not see any imaging studies of the low back in our system, patient states they did the workup in [place]  -At this time it is felt appropriate to restart a low dose of gabapentin 100 mg TID and reorder some PT to help with his back pain\  -If this fails to help we can look into getting more imaging to confirm there is no acute ongoing process  -Physical exam today is reassuring and " he is not having issues with his gait or daily activities at this time, it is more when he is up moving around for a long period of time  -Will follow up in two weeks to ensure he is feeling stable    (R21) Rash and nonspecific skin eruption  (L30.0) Nummular eczema  -Patient presents with months of rash on his legs, arms and trunk of his body  -The rash on the legs does appears somewhat different than the rash on his trunk and arms  -The legs appear to be nummular eczema, the rash on the body is less consistent with this though may just be early stages  -Potentially could be folliculitis as well  -At this time we will treat with a higher concentration of triamcinolone 0.5 BID with Aquaphor to help moisten the skin  -We will follow up in two weeks to see how he is feeling and adjust treatment as needed  -Recommended to switch to odor free and sensitive skin laundry detergents    There are no discontinued medications.    Options for treatment and follow-up care were reviewed with the patient and/or guardian. Andriy Davila Jr. and/or guardian engaged in the decision making process and verbalized understanding of the options discussed and agreed with the final plan    Precepted with Dr. Moon.    Mikey Catalan MD on 3/21/2022 at 2:46 PM

## 2022-03-23 NOTE — PROGRESS NOTES
Preceptor Attestation:  Patient's case reviewed and discussed with the resident, Mikey Catalan MD, and I personally evaluated the patient. I agree with written assessment and plan of care.    Supervising Physician:  Lizzie Moon MD   [facility]

## 2022-06-14 ENCOUNTER — PATIENT OUTREACH (OUTPATIENT)
Dept: FAMILY MEDICINE | Facility: CLINIC | Age: 57
End: 2022-06-14
Payer: COMMERCIAL

## 2022-06-14 NOTE — PROGRESS NOTES
Clinic Care Coordination Contact    Follow Up Progress Note      Assessment: The pt was recently in the ED, I called to check up on the pt and help the pt setup a ED follow up. The pt was at [facility] for right hip pain. I called the pt, but the pt phone number on files was not working. I tried the pt emergency phone number, and left a vm for her to give me a call back.     Care Gaps:    Health Maintenance Due   Topic Date Due     PREVENTIVE CARE VISIT  Never done     SPIROMETRY  Never done     ADVANCE CARE PLANNING  Never done     COVID-19 Vaccine (1) Never done     COLORECTAL CANCER SCREENING  Never done     HIV SCREENING  Never done     HEPATITIS C SCREENING  Never done     ZOSTER IMMUNIZATION (1 of 2) Never done     ASTHMA ACTION PLAN  08/26/2014     PHQ-9  08/09/2016     DTAP/TDAP/TD IMMUNIZATION (3 - Td or Tdap) 12/05/2021     PHQ-2 (once per calendar year)  01/01/2022     ASTHMA CONTROL TEST  06/08/2022           Goals addressed this encounter:    Goals Addressed    None         Intervention/Education provided during outreach:               Plan:     Care Coordinator will follow up in month

## 2022-06-15 ENCOUNTER — PATIENT OUTREACH (OUTPATIENT)
Dept: FAMILY MEDICINE | Facility: CLINIC | Age: 57
End: 2022-06-15
Payer: COMMERCIAL

## 2022-06-15 NOTE — PROGRESS NOTES
Clinic Care Coordination Contact    Follow Up Progress Note      Assessment: The pt was recently in the ED, I called to check up on the pt and help the pt setup a ED follow up. The pt was at [facility] for right hip pain. I called the pt,but his number was not working. I called the pt emergency contact, but got a vm, so I left a vm for her to give me a call back.     Care Gaps:    Health Maintenance Due   Topic Date Due     PREVENTIVE CARE VISIT  Never done     SPIROMETRY  Never done     ADVANCE CARE PLANNING  Never done     COVID-19 Vaccine (1) Never done     COLORECTAL CANCER SCREENING  Never done     HIV SCREENING  Never done     HEPATITIS C SCREENING  Never done     ZOSTER IMMUNIZATION (1 of 2) Never done     ASTHMA ACTION PLAN  08/26/2014     PHQ-9  08/09/2016     DTAP/TDAP/TD IMMUNIZATION (3 - Td or Tdap) 12/05/2021     PHQ-2 (once per calendar year)  01/01/2022     ASTHMA CONTROL TEST  06/08/2022           Goals addressed this encounter:    Goals Addressed    None         Intervention/Education provided during outreach:               Plan:     Care Coordinator will follow up in month

## 2022-06-16 ENCOUNTER — PATIENT OUTREACH (OUTPATIENT)
Dept: FAMILY MEDICINE | Facility: CLINIC | Age: 57
End: 2022-06-16
Payer: COMMERCIAL

## 2022-06-16 NOTE — PROGRESS NOTES
Clinic Care Coordination Contact    Follow Up Progress Note      Assessment: The pt was recently in the ED, I called to check up on the pt and help the pt setup a ED follow up. The pt was at [facility] for a right hip pain. I called the pt phone number on file and its not working. I was able to get a hold of the pt emergency contact phone number, and it was also the wrong number.     Care Gaps:    Health Maintenance Due   Topic Date Due     PREVENTIVE CARE VISIT  Never done     SPIROMETRY  Never done     ADVANCE CARE PLANNING  Never done     COVID-19 Vaccine (1) Never done     COLORECTAL CANCER SCREENING  Never done     HIV SCREENING  Never done     HEPATITIS C SCREENING  Never done     ZOSTER IMMUNIZATION (1 of 2) Never done     ASTHMA ACTION PLAN  08/26/2014     PHQ-9  08/09/2016     DTAP/TDAP/TD IMMUNIZATION (3 - Td or Tdap) 12/05/2021     PHQ-2 (once per calendar year)  01/01/2022     ASTHMA CONTROL TEST  06/08/2022           Goals addressed this encounter:    Goals Addressed    None         Intervention/Education provided during outreach:               Plan:     Care Coordinator will follow up in month

## 2022-06-29 ENCOUNTER — OFFICE VISIT (OUTPATIENT)
Dept: FAMILY MEDICINE | Facility: CLINIC | Age: 57
End: 2022-06-29
Payer: COMMERCIAL

## 2022-06-29 VITALS
TEMPERATURE: 97.9 F | SYSTOLIC BLOOD PRESSURE: 134 MMHG | DIASTOLIC BLOOD PRESSURE: 89 MMHG | OXYGEN SATURATION: 97 % | RESPIRATION RATE: 20 BRPM | BODY MASS INDEX: 26.7 KG/M2 | HEIGHT: 74 IN | WEIGHT: 208.08 LBS | HEART RATE: 66 BPM

## 2022-06-29 DIAGNOSIS — G89.29 CHRONIC BILATERAL LOW BACK PAIN WITHOUT SCIATICA: ICD-10-CM

## 2022-06-29 DIAGNOSIS — Z12.11 SCREEN FOR COLON CANCER: ICD-10-CM

## 2022-06-29 DIAGNOSIS — M54.50 CHRONIC BILATERAL LOW BACK PAIN WITHOUT SCIATICA: ICD-10-CM
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
## 2022-06-29 DIAGNOSIS — R21 RASH AND NONSPECIFIC SKIN ERUPTION: Primary | ICD-10-CM

## 2022-06-29 PROCEDURE — 90715 TDAP VACCINE 7 YRS/> IM: CPT | Performed by: STUDENT IN AN ORGANIZED HEALTH CARE EDUCATION/TRAINING PROGRAM

## 2022-06-29 PROCEDURE — 90471 IMMUNIZATION ADMIN: CPT | Performed by: STUDENT IN AN ORGANIZED HEALTH CARE EDUCATION/TRAINING PROGRAM

## 2022-06-29 PROCEDURE — 99214 OFFICE O/P EST MOD 30 MIN: CPT | Mod: 25 | Performed by: STUDENT IN AN ORGANIZED HEALTH CARE EDUCATION/TRAINING PROGRAM

## 2022-06-29 RX ORDER — CYCLOBENZAPRINE HCL 5 MG
5 TABLET ORAL
Qty: 5 TABLET | Refills: 0 | Status: SHIPPED | OUTPATIENT
Start: 2022-06-29

## 2022-06-29 RX ORDER — GABAPENTIN 300 MG/1
CAPSULE ORAL
Qty: 180 CAPSULE | Refills: 1 | Status: SHIPPED | OUTPATIENT
Start: 2022-06-29 | End: 2023-08-25

## 2022-06-29 ASSESSMENT — PATIENT HEALTH QUESTIONNAIRE - PHQ9: SUM OF ALL RESPONSES TO PHQ QUESTIONS 1-9: 6

## 2022-06-29 NOTE — PROGRESS NOTES
CHIEF COMPLAINT                                                      Chief Complaint   Patient presents with     Follow Up     ED follow up, slip hurt toes, leg and back     Rash     Check rash, giving cream, but rash is still there     Medication Reconciliation     Needs review, giving new med at hospital     SUBJECTIVE:                                                    Andriy Davila  is a 57 year old year old male who presents to clinic today for the following health issues:    ED follow up:  -Was seen in ED on 6/12 after slipping on some oil at a gas station and falling and hurting his right hip  -Did get xray of the pelvis that did not show any significant discomfort   -Has a history of chronic bilateral low back pain  -Has been worsened since the fall  -Stable, but not improving  -Now describes the pain as burning more so  -There is no incontinence of the bowel or bladder with this, no saddle paraesthesias or other acute issues     Follow up rash  -It has not gotten better with the triamcinolone and aquafor  -It has also not gotten worse  -Will take benadryl to good relief of the itch with this    Patient is an established patient of this clinic.    ----------------------------------------------------------------------------------------------------------------------  Patient Active Problem List   Diagnosis     Health Care Home     Asthma     Lumbago     Pain of right upper arm     Allergic rhinitis, seasonal     Environmental allergies     Elevated blood pressure reading without diagnosis of hypertension     Generalized osteoarthrosis, unspecified site     Past Surgical History:   Procedure Laterality Date     NO HISTORY OF SURGERY         Social History     Tobacco Use     Smoking status: Passive Smoke Exposure - Never Smoker     Smokeless tobacco: Never Used   Substance Use Topics     Alcohol use: Yes     Alcohol/week: 6.0 standard drinks     Types: 6 Cans of beer per week     Family History   Problem  Relation Age of Onset     Diabetes Mother      Hypertension Mother      Breast Cancer No family hx of      Cancer - colorectal No family hx of      Ovarian Cancer No family hx of      Cerebrovascular Disease No family hx of      Asthma No family hx of          Problem list and past medical, surgical, social, and family histories reviewed & adjusted, as indicated.    Current Outpatient Medications   Medication Sig Dispense Refill     acetaminophen (TYLENOL) 500 MG tablet Take 2 tablets (1,000 mg) by mouth every 6 hours as needed for mild pain 100 tablet 1     budesonide-formoterol (SYMBICORT) 80-4.5 MCG/ACT Inhaler Use two puffs twice daily and then use one inhalation as needed up to 8 additional times each day for control 20.4 g 3     cetirizine (ZYRTEC) 10 MG tablet Take 1 tablet (10 mg) by mouth daily 90 tablet 3     diphenhydrAMINE (BENADRYL) 25 MG tablet Take 1 tablet (25 mg) by mouth nightly as needed for itching or allergies 90 tablet 0     fluticasone (FLONASE) 50 MCG/ACT nasal spray Spray 2 sprays into both nostrils daily 16 g 3     gabapentin (NEURONTIN) 100 MG capsule Take 1 capsule (100 mg) by mouth 3 times daily 90 capsule 0     losartan (COZAAR) 25 MG tablet Take 1 tablet (25 mg) by mouth daily 30 tablet 11     mineral oil-hydrophilic petrolatum (AQUAPHOR) external ointment Apply topically as needed for dry skin or irritation 420 g 0     triamcinolone (KENALOG) 0.5 % external ointment Apply to the arms, trunk and legs as needed. 30 g 0     Medication list reviewed and updated as indicated.    Allergies   Allergen Reactions     Shellfish-Derived Products Anaphylaxis     Seasonal Allergies      Allergies reviewed and updated as indicated.  ----------------------------------------------------------------------------------------------------------------------  ROS:  Constitutional, HEENT, cardiovascular, pulmonary, GI, musculoskeletal, neuro, skin, and psych systems are negative, except as otherwise  "noted.    OBJECTIVE:     /89 (BP Location: Right arm, Patient Position: Sitting, Cuff Size: Adult Large)   Pulse 66   Temp 97.9  F (36.6  C) (Oral)   Resp 20   Ht 1.873 m (6' 1.75\")   Wt 94.4 kg (208 lb 1.3 oz)   SpO2 97%   BMI 26.90 kg/m    Body mass index is 26.9 kg/m .  Exam:  Constitutional: healthy, alert and no distress  Head: Normocephalic. No masses, lesions, tenderness or abnormalities  Cardiovascular: RRR, no murmur, rub or gallop  Respiratory: CTAB, no wheezing, rales or rhonchi   Gastrointestinal: Abdomen soft, non-tender. BS normal. No masses, organomegaly  Musculoskeletal: extremities normal- no gross deformities noted, gait normal and normal muscle tone.  Extension at the bilateral hips will cause some pain in the low back and right posterior thigh.  No pain with internal or external rotation on the left, pain with external rotation on the right.  No tenderness over the spine, has some paraspinal muscle tenderness on the right.  Strength and sensation symmetric.    Skin: To the anterior shin confluent 4-5 cm rash consisting of small ~1-2 mm slightly, hyperpigmented raised papules, no erythema, warmth, fluctuance, tenderness, or other concerning features.  There are some slightly larger areas in each area.    Neurologic: Gait normal. Reflexes normal and symmetric. Sensation grossly WNL.  Psychiatric: mentation appears normal and affect normal/bright  Hematologic/Lymphatic/Immunologic: Normal cervical lymph nodes    Diagnostic Test Results:  none     ASSESSMENT/PLAN:     (R21) Rash and nonspecific skin eruption  (primary encounter diagnosis)  -Last seen with concern for possible nummular eczema vs folliculitis  -Since his last visit symptoms have not changed  -If it was an autoimmune reaction expect steroids would have helped  -If it was an improperly diagnosed infectious process expect the rash should have gotten worse  -At this time unclear what this is, will send referral to " dermatologist for further workup and possible biopsy    (Z12.11) Screen for colon cancer  -Referral sent as patient overdue for screening  -He is open for referral at this time     (M54.50,  G89.29) Chronic bilateral low back pain without sciatica  (W19.XXXD) Fall, subsequent encounter  -Patient presenting in follow up for exacerbation of his chronic pain since a fall  -It has been stable since his fall ~3 weeks ago, but it has changed and is now a burning sensation  -Ambulating alright at this time  -Discussed with the chronic pain and the new burning sensation possibly following up with a spine specialist to discuss possible further workup   -Patient in agreement at this time with the plan  -Will increase his gabapentin as the 100 mg TID has not been helping  -Recalls he was on 800 mg TID in the past to some relief  -Will begin to titrate him up at this time  -Also sending with a couple of tablets of flexeril to help with sleeping      There are no discontinued medications.    Options for treatment and follow-up care were reviewed with the patient and/or guardian. Andriy Davila Jr. and/or guardian engaged in the decision making process and verbalized understanding of the options discussed and agreed with the final plan    Precepted with Dr. Cormier.    Mikey Catalan MD on 6/29/2022 at 9:50 AM

## 2022-07-07 ENCOUNTER — OFFICE VISIT (OUTPATIENT)
Dept: DERMATOLOGY | Facility: CLINIC | Age: 57
End: 2022-07-07
Attending: FAMILY MEDICINE
Payer: COMMERCIAL

## 2022-07-07 DIAGNOSIS — R21 RASH AND NONSPECIFIC SKIN ERUPTION: ICD-10-CM

## 2022-07-07 DIAGNOSIS — L20.9 ATOPIC DERMATITIS, UNSPECIFIED TYPE: Primary | ICD-10-CM

## 2022-07-07 PROCEDURE — 99204 OFFICE O/P NEW MOD 45 MIN: CPT | Performed by: DERMATOLOGY

## 2022-07-07 RX ORDER — BETAMETHASONE DIPROPIONATE 0.5 MG/G
OINTMENT, AUGMENTED TOPICAL
Qty: 45 G | Refills: 5 | Status: SHIPPED | OUTPATIENT
Start: 2022-07-07 | End: 2023-08-22

## 2022-07-07 RX ORDER — TACROLIMUS 1 MG/G
OINTMENT TOPICAL
Qty: 60 G | Refills: 11 | Status: SHIPPED | OUTPATIENT
Start: 2022-07-07 | End: 2023-08-22

## 2022-07-07 ASSESSMENT — PAIN SCALES - GENERAL: PAINLEVEL: MODERATE PAIN (5)

## 2022-07-07 NOTE — PROGRESS NOTES
[facility] Dermatology Note  Encounter Date: Jul 7, 2022  Office Visit     Dermatology Problem List:  1. Atopic dermatitis /papular eczema  - augmented betamethasone, tacrolimus  ____________________________________________    Assessment & Plan:    # Atopic dermatitis/ papular eczema in setting of history of asthma and seasonal allergies. Chronic, flare.   Recommended treatment with a stronger class 1 topical steroid and a calcineurin inhibitor. Also discussed the risks and benefits of dupixent, as this could also help his asthma. Will consider this at the follow-up if he is not noticing improvement.    - Start applying augmented betamethasone 0.05% twice daily to the affected areas for two weeks.  Take a two week break before restarting if needed.   - After two weeks, start applying tacrolimus 0.1% ointment twice daily to the affected areas. Taper frequency once the rash clears. If not clear, restart the betamethasone after two weeks.   - Continue moisturizing with Aquaphor  - Future considerations: dupixent    Procedures Performed:   None    Follow-up: 3 month(s) in-person, or earlier for new or changing lesions    Staff and Scribe:     Scribe Disclosure:  I, Laura Tesfaye, am serving as a scribe to document services personally performed by Talib Grullon MD based on data collection and the provider's statements to me.     Provider Disclosure:   The documentation recorded by the scribe accurately reflects the services I personally performed and the decisions made by me.    Talib Grullon MD    Department of Dermatology  [facility]: Phone: [phone], Fax:[phone]  [facility]: Phone: [phone] Fax: [phone]  ____________________________________________    CC: Rash (Rash on lower legs, itchy and painful)    HPI:  Mr. Andriy Davila  is a(n) 57 year old  male who presents today as a new patient for a rash on his lower extremity. He states it started in May after traveling to [place] where he had numerous mosquito bites on the legs. He received a topical steroid to treat, but it has not been helpful. He endorses a history of asthma, allergies, and eczema. His asthma is well controlled currently. Patient is otherwise feeling well, without additional skin concerns.    Labs Reviewed:  N/A    Physical Exam:  Vitals: There were no vitals taken for this visit.  SKIN: Focused examination of lower extremities was performed.  - Mild xerosis cutis on bilateral lower extremities.   - Few scattered pink scaly lichenified papules on the bilateral shins.   - No other lesions of concern on areas examined.     Medications:  Current Outpatient Medications   Medication     budesonide-formoterol (SYMBICORT) 80-4.5 MCG/ACT Inhaler     cyclobenzaprine (FLEXERIL) 5 MG tablet     diphenhydrAMINE (BENADRYL) 25 MG tablet     gabapentin (NEURONTIN) 100 MG capsule     gabapentin (NEURONTIN) 300 MG capsule     losartan (COZAAR) 25 MG tablet     mineral oil-hydrophilic petrolatum (AQUAPHOR) external ointment     triamcinolone (KENALOG) 0.5 % external ointment     acetaminophen (TYLENOL) 500 MG tablet     cetirizine (ZYRTEC) 10 MG tablet     fluticasone (FLONASE) 50 MCG/ACT nasal spray     No current facility-administered medications for this visit.      Past Medical History:   Patient Active Problem List   Diagnosis     Health Care Home     Asthma     Lumbago     Pain of right upper arm     Allergic rhinitis, seasonal     Environmental allergies     Elevated blood pressure reading without diagnosis of hypertension     Generalized osteoarthrosis, unspecified site     Past Medical History:   Diagnosis Date     Asthma      Chronic low back pain      Hypertension     History Of        CC Forrest Cormier MD  [address]  [place] [address] on close of this encounter.

## 2022-07-07 NOTE — PATIENT INSTRUCTIONS
Start augmented betamethasone 0.05% ointment twice daily for up to 2 weeks at a time. Take a break for at least 2 weeks before restarting.   After augmented betamethasone 0.05% ointment, switch to protopic 0.1% ointment twice daily. Continue long-term, but if the rash clears, can taper the frequency.   Continue the Aquaphor.   Future considerations: dupixent.

## 2022-07-07 NOTE — NURSING NOTE
Dermatology Rooming Note    Andriy Davila Jr.'s goals for this visit include:   Chief Complaint   Patient presents with     Rash     Rash on lower legs, itchy and painful     Emely Quach, Visit Facilitator

## 2022-07-07 NOTE — LETTER
7/7/2022       RE: Andriy Davila Jr.  [address] [place] [address]     Dear Colleague,    Thank you for referring your patient, Andriy Davila Jr., to the [facility] [place] [facility]. Please see a copy of my visit note below.    [facility] Dermatology Note  Encounter Date: Jul 7, 2022  Office Visit     Dermatology Problem List:  1. Atopic dermatitis /papular eczema  - augmented betamethasone, tacrolimus  ____________________________________________    Assessment & Plan:    # Atopic dermatitis/ papular eczema in setting of history of asthma and seasonal allergies. Chronic, flare.   Recommended treatment with a stronger class 1 topical steroid and a calcineurin inhibitor. Also discussed the risks and benefits of dupixent, as this could also help his asthma. Will consider this at the follow-up if he is not noticing improvement.    - Start applying augmented betamethasone 0.05% twice daily to the affected areas for two weeks.  Take a two week break before restarting if needed.   - After two weeks, start applying tacrolimus 0.1% ointment twice daily to the affected areas. Taper frequency once the rash clears. If not clear, restart the betamethasone after two weeks.   - Continue moisturizing with Aquaphor  - Future considerations: dupixent    Procedures Performed:   None    Follow-up: 3 month(s) in-person, or earlier for new or changing lesions    Staff and Scribe:     Scribe Disclosure:  I, Laura Tesfaye, am serving as a scribe to document services personally performed by Talib Grullon MD based on data collection and the provider's statements to me.     Provider Disclosure:   The documentation recorded by the scribe accurately reflects the services I personally performed and the decisions made by me.    Talib Grullon MD    Department of Dermatology  [facility]  [facility]: Phone: [phone], Fax:[phone]  [facility]: Phone: [phone] Fax: [phone]  ____________________________________________    CC: Rash (Rash on lower legs, itchy and painful)    HPI:  Mr. Andriy Davila Jr. is a(n) 57 year old male who presents today as a new patient for a rash on his lower extremity. He states it started in May after traveling to [place] where he had numerous mosquito bites on the legs. He received a topical steroid to treat, but it has not been helpful. He endorses a history of asthma, allergies, and eczema. His asthma is well controlled currently. Patient is otherwise feeling well, without additional skin concerns.    Labs Reviewed:  N/A    Physical Exam:  Vitals: There were no vitals taken for this visit.  SKIN: Focused examination of lower extremities was performed.  - Mild xerosis cutis on bilateral lower extremities.   - Few scattered pink scaly lichenified papules on the bilateral shins.   - No other lesions of concern on areas examined.     Medications:  Current Outpatient Medications   Medication     budesonide-formoterol (SYMBICORT) 80-4.5 MCG/ACT Inhaler     cyclobenzaprine (FLEXERIL) 5 MG tablet     diphenhydrAMINE (BENADRYL) 25 MG tablet     gabapentin (NEURONTIN) 100 MG capsule     gabapentin (NEURONTIN) 300 MG capsule     losartan (COZAAR) 25 MG tablet     mineral oil-hydrophilic petrolatum (AQUAPHOR) external ointment     triamcinolone (KENALOG) 0.5 % external ointment     acetaminophen (TYLENOL) 500 MG tablet     cetirizine (ZYRTEC) 10 MG tablet     fluticasone (FLONASE) 50 MCG/ACT nasal spray     No current facility-administered medications for this visit.      Past Medical History:   Patient Active Problem List   Diagnosis     Health Care Home     Asthma     Lumbago     Pain of right upper arm     Allergic rhinitis, seasonal     Environmental allergies     Elevated blood pressure reading  without diagnosis of hypertension     Generalized osteoarthrosis, unspecified site     Past Medical History:   Diagnosis Date     Asthma      Chronic low back pain      Hypertension     History Of        CC Forrest Cormier MD  [address]  [facility] [address]  [place] [address] on close of this encounter.

## 2022-07-08 ENCOUNTER — TELEPHONE (OUTPATIENT)
Dept: DERMATOLOGY | Facility: CLINIC | Age: 57
End: 2022-07-08

## 2022-07-08 DIAGNOSIS — L20.9 ATOPIC DERMATITIS, UNSPECIFIED TYPE: Primary | ICD-10-CM

## 2022-07-08 NOTE — TELEPHONE ENCOUNTER
Central Prior Authorization Team   Phone: [phone]      EPA DENIED: DENIAL LETTER IS ATTACHED

## 2022-07-08 NOTE — TELEPHONE ENCOUNTER
PRIOR AUTHORIZATION DENIED    Medication: DIPROLENE - EPA DENIED    Denial Date: 7/8/2022    Denial Rational: PATIENT NEEDS TO TRY/FAIL PREFERRED ALTERNATIVES:      Appeal Information:

## 2022-07-08 NOTE — TELEPHONE ENCOUNTER
PRIOR AUTHORIZATION DENIED    Medication: tacrolimus (PROTOPIC) 0.1 % external ointment - EPA DENIED     Denial Date: 7/8/2022    Denial Rational: NEEDS TO TRY/FAIL 2 PREFERRED ALTERNATIVES FOR AT LEAST 2 WEEKS OR HAVE CONTRAINDICATION TO THEM      Appeal Information:

## 2022-07-11 NOTE — TELEPHONE ENCOUNTER
Attempted to reach this patient to let him know but was unable to leave a message.    Darcie Irizarry, EMT

## 2022-07-11 NOTE — TELEPHONE ENCOUNTER
I called and spoke with Gene and gave information below.    I sent over betamethasone valerate 0.05% ointment - can we have patient use this for twice daily 2 weeks on, 1 week off. We can discuss re-submitting for the protopic 0.1% ointment at his next visit.

## 2022-07-19 ENCOUNTER — PATIENT OUTREACH (OUTPATIENT)
Dept: DERMATOLOGY | Facility: CLINIC | Age: 57
End: 2022-07-19

## 2022-07-19 NOTE — LETTER
July 19, 2022      Andriy Davila Jr.  [address] [place] [address]      Dear Andriy Davila Jr.:    We recently reviewed your medical records from [facility] and found that you are due for an appointment with Dr. Talib Grullon.  Our office has attempted to reach you via telephone and left a message.    At your earliest convenience, please call the clinic at [phone] to arrange the recommended exam and possible testing.  Please kindly mention this letter when calling so that your appointment(s) can be accurately scheduled.      Sincerely,       The Clinic Staff        Medical Record Number:  [id number]

## 2022-07-19 NOTE — TELEPHONE ENCOUNTER
Attempted to reach patient to schedule follow up in the Dermatology Clinic.  No answer,  LM on VM to call office and Letter mailed.    Schedule with Dr. Talib Grullon - Oct- [id number]-rash

## 2022-07-21 ENCOUNTER — TELEPHONE (OUTPATIENT)
Dept: DERMATOLOGY | Facility: CLINIC | Age: 57
End: 2022-07-21

## 2022-07-21 NOTE — TELEPHONE ENCOUNTER
Attempted call to patient to schedule follow up in the Dermatology Clinic per Dr Grullon last visit on 07/07/22 checkout comment dispositions. Unable to leave message. No voicemail set up. Send letter.

## 2022-07-26 ENCOUNTER — TELEPHONE (OUTPATIENT)
Dept: FAMILY MEDICINE | Facility: CLINIC | Age: 57
End: 2022-07-26

## 2022-10-26 ENCOUNTER — TELEPHONE (OUTPATIENT)
Dept: DERMATOLOGY | Facility: CLINIC | Age: 57
End: 2022-10-26

## 2022-10-26 DIAGNOSIS — L20.9 ATOPIC DERMATITIS, UNSPECIFIED TYPE: Primary | ICD-10-CM

## 2022-10-26 NOTE — TELEPHONE ENCOUNTER
Central Prior Authorization Team   Phone: [phone]    PA Initiation    Medication: Tacrolimus 0.1% Ointment  Insurance Company: [employer] - Phone [phone] Fax [phone]  Pharmacy Filling the Rx: [id number] - [place], [place] - [address] [place] [address] AT [facility] [place] [place] [address]  Filling Pharmacy Phone: [phone]  Filling Pharmacy Fax:    Start Date: 10/26/2022

## 2022-10-26 NOTE — TELEPHONE ENCOUNTER
Please initiate prior authorization for the Tacrolimus 0.1% Ointment. Thank you!    Quinn Arreola  In Clinic Visit Facilitator

## 2022-10-27 NOTE — TELEPHONE ENCOUNTER
PRIOR AUTHORIZATION DENIED    Medication: Tacrolimus 0.1% Ointment    Denial Date: 10/27/2022    Denial Rational: Must try/fail two preferred formulary drugs for at least 2 weeks. Clinic notes were sent in showing patient tried/failed betamethasone and triamcinolone but was still denied.         Appeal Information: This medication was denied. If physician would like to appeal because patient has contraindication or allergy to covered medication please write letter of medical necessity and route back to PA team to initiate.  If no further action is needed please close encounter thank you.

## 2022-10-28 RX ORDER — HALOBETASOL PROPIONATE 0.05 %
OINTMENT (GRAM) TOPICAL 2 TIMES DAILY
Qty: 50 G | Refills: 2 | Status: SHIPPED | OUTPATIENT
Start: 2022-10-28

## 2022-11-16 NOTE — TELEPHONE ENCOUNTER
Yadi, Talib Ling MD  You; Joellen Chandler, RN 2 weeks ago     MM  I sent halobetasol 0.05% cream as alternative as it looks like his insurance requires failure of 2 high potency topical steroids.     Talib Grullon MD   Pronouns: he/him/his      Department of Dermatology   [facility] [place]]: Phone: [phone], Fax:[phone]   [facility]: Phone: [phone] Fax: [phone]

## 2022-11-25 NOTE — TELEPHONE ENCOUNTER
Attempted to call pt to see whether he was aware of the halobetasol 0.05% cream, but pt did not  and could not leave VM as it was not setup.

## 2022-12-09 DIAGNOSIS — R21 RASH AND NONSPECIFIC SKIN ERUPTION: ICD-10-CM

## 2022-12-09 DIAGNOSIS — I10 BENIGN ESSENTIAL HYPERTENSION: ICD-10-CM

## 2022-12-09 RX ORDER — TRIAMCINOLONE ACETONIDE 5 MG/G
OINTMENT TOPICAL
Qty: 30 G | Refills: 3 | Status: SHIPPED | OUTPATIENT
Start: 2022-12-09

## 2022-12-09 RX ORDER — LOSARTAN POTASSIUM 25 MG/1
25 TABLET ORAL DAILY
Qty: 30 TABLET | Refills: 1 | Status: SHIPPED | OUTPATIENT
Start: 2022-12-09 | End: 2023-08-22

## 2022-12-09 NOTE — TELEPHONE ENCOUNTER
Please call patient and help him make an appointment for a blood pressure check. He has not been seen for his blood pressure in about a year. Will give a couple months of refills, but would like him to be seen in the next month.     Ceci Schmidt MD

## 2022-12-13 NOTE — TELEPHONE ENCOUNTER
Called no answer. Unable to LVM due to VM not set up yet. Per MD needs appt for BP check and future refills

## 2022-12-15 ENCOUNTER — TELEPHONE (OUTPATIENT)
Dept: FAMILY MEDICINE | Facility: CLINIC | Age: 57
End: 2022-12-15

## 2022-12-15 NOTE — TELEPHONE ENCOUNTER
[facility] phone call message- medication clarification/question:    Full Medication Name: triamcinolone (KENALOG)   Dose: 0.5 % external ointment    Question: Pharmacist called stating they are needing to know how many times medication is being apply to effected area. Please call and advise.      Pharmacy confirmed [place]    [id number] - [facility] [address] AT [address] Yes    OK to leave a message on voice mail? Yes    Primary language: English      needed? No    Call taken on December 15, 2022 at 4:41 PM by Carly Wells

## 2022-12-19 NOTE — TELEPHONE ENCOUNTER
Called, left detailed message on voicemail per Dr Gutiérrez's review, Triamcinolone 0.5% ointment to apply daily as needed. If this remains insufficient to please call back. Rocío MILLS

## 2023-08-22 ENCOUNTER — OFFICE VISIT (OUTPATIENT)
Dept: FAMILY MEDICINE | Facility: CLINIC | Age: 58
End: 2023-08-22
Payer: COMMERCIAL

## 2023-08-22 VITALS
DIASTOLIC BLOOD PRESSURE: 90 MMHG | HEART RATE: 69 BPM | HEIGHT: 74 IN | SYSTOLIC BLOOD PRESSURE: 148 MMHG | WEIGHT: 203 LBS | BODY MASS INDEX: 26.05 KG/M2 | OXYGEN SATURATION: 97 % | TEMPERATURE: 98.2 F

## 2023-08-22 DIAGNOSIS — L20.9 ATOPIC DERMATITIS, UNSPECIFIED TYPE: ICD-10-CM

## 2023-08-22 DIAGNOSIS — G89.29 CHRONIC BILATERAL LOW BACK PAIN WITH BILATERAL SCIATICA: Primary | ICD-10-CM

## 2023-08-22 DIAGNOSIS — Z11.59 NEED FOR HEPATITIS C SCREENING TEST: ICD-10-CM

## 2023-08-22 DIAGNOSIS — Z11.4 SCREENING FOR HIV (HUMAN IMMUNODEFICIENCY VIRUS): ICD-10-CM

## 2023-08-22 DIAGNOSIS — M54.42 CHRONIC BILATERAL LOW BACK PAIN WITH BILATERAL SCIATICA: Primary | ICD-10-CM

## 2023-08-22 DIAGNOSIS — M54.41 CHRONIC BILATERAL LOW BACK PAIN WITH BILATERAL SCIATICA: Primary | ICD-10-CM

## 2023-08-22 DIAGNOSIS — J45.40 MODERATE PERSISTENT ASTHMA WITHOUT COMPLICATION: ICD-10-CM

## 2023-08-22 DIAGNOSIS — I10 BENIGN ESSENTIAL HYPERTENSION: ICD-10-CM

## 2023-08-22 PROCEDURE — 99214 OFFICE O/P EST MOD 30 MIN: CPT | Mod: GC

## 2023-08-22 PROCEDURE — 86803 HEPATITIS C AB TEST: CPT

## 2023-08-22 PROCEDURE — 36415 COLL VENOUS BLD VENIPUNCTURE: CPT

## 2023-08-22 PROCEDURE — 80048 BASIC METABOLIC PNL TOTAL CA: CPT

## 2023-08-22 PROCEDURE — 87389 HIV-1 AG W/HIV-1&-2 AB AG IA: CPT

## 2023-08-22 RX ORDER — TACROLIMUS 1 MG/G
OINTMENT TOPICAL
Qty: 60 G | Refills: 11 | Status: SHIPPED | OUTPATIENT
Start: 2023-08-22

## 2023-08-22 RX ORDER — ALBUTEROL SULFATE 0.83 MG/ML
SOLUTION RESPIRATORY (INHALATION)
COMMUNITY
Start: 2023-03-09

## 2023-08-22 RX ORDER — GABAPENTIN 300 MG/1
600 CAPSULE ORAL 3 TIMES DAILY
Qty: 180 CAPSULE | Refills: 1 | Status: SHIPPED | OUTPATIENT
Start: 2023-08-22

## 2023-08-22 RX ORDER — FLUTICASONE PROPIONATE AND SALMETEROL 50; 500 UG/1; UG/1
1 POWDER RESPIRATORY (INHALATION) 2 TIMES DAILY
Qty: 60 EACH | Refills: 11 | Status: SHIPPED | OUTPATIENT
Start: 2023-08-22

## 2023-08-22 RX ORDER — IBUPROFEN 600 MG/1
TABLET, FILM COATED ORAL
COMMUNITY
Start: 2023-03-30

## 2023-08-22 RX ORDER — GABAPENTIN 300 MG/1
CAPSULE ORAL
Qty: 180 CAPSULE | Refills: 1 | Status: CANCELLED | OUTPATIENT
Start: 2023-08-22

## 2023-08-22 RX ORDER — FLUTICASONE PROPIONATE AND SALMETEROL 50; 500 UG/1; UG/1
1 POWDER RESPIRATORY (INHALATION) 2 TIMES DAILY
COMMUNITY
Start: 2023-04-25 | End: 2023-08-22

## 2023-08-22 RX ORDER — ALBUTEROL SULFATE 90 UG/1
AEROSOL, METERED RESPIRATORY (INHALATION)
COMMUNITY
Start: 2023-04-26 | End: 2023-08-22

## 2023-08-22 RX ORDER — ALBUTEROL SULFATE 90 UG/1
1-2 AEROSOL, METERED RESPIRATORY (INHALATION) EVERY 4 HOURS PRN
Qty: 18 G | Refills: 11 | Status: SHIPPED | OUTPATIENT
Start: 2023-08-22

## 2023-08-22 RX ORDER — PREDNISONE 20 MG/1
TABLET ORAL
COMMUNITY
Start: 2023-03-09

## 2023-08-22 RX ORDER — LOSARTAN POTASSIUM 25 MG/1
25 TABLET ORAL DAILY
Qty: 30 TABLET | Refills: 1 | Status: SHIPPED | OUTPATIENT
Start: 2023-08-22 | End: 2023-11-08

## 2023-08-22 RX ORDER — BETAMETHASONE DIPROPIONATE 0.5 MG/G
OINTMENT, AUGMENTED TOPICAL
Qty: 45 G | Refills: 5 | Status: SHIPPED | OUTPATIENT
Start: 2023-08-22

## 2023-08-22 RX ORDER — MONTELUKAST SODIUM 10 MG/1
TABLET ORAL
COMMUNITY
Start: 2023-03-09

## 2023-08-22 ASSESSMENT — ASTHMA QUESTIONNAIRES
QUESTION_4 LAST FOUR WEEKS HOW OFTEN HAVE YOU USED YOUR RESCUE INHALER OR NEBULIZER MEDICATION (SUCH AS ALBUTEROL): NOT AT ALL
QUESTION_2 LAST FOUR WEEKS HOW OFTEN HAVE YOU HAD SHORTNESS OF BREATH: ONCE OR TWICE A WEEK
QUESTION_1 LAST FOUR WEEKS HOW MUCH OF THE TIME DID YOUR ASTHMA KEEP YOU FROM GETTING AS MUCH DONE AT WORK, SCHOOL OR AT HOME: SOME OF THE TIME
ACT_TOTALSCORE: 18
QUESTION_5 LAST FOUR WEEKS HOW WOULD YOU RATE YOUR ASTHMA CONTROL: POORLY CONTROLLED
QUESTION_3 LAST FOUR WEEKS HOW OFTEN DID YOUR ASTHMA SYMPTOMS (WHEEZING, COUGHING, SHORTNESS OF BREATH, CHEST TIGHTNESS OR PAIN) WAKE YOU UP AT NIGHT OR EARLIER THAN USUAL IN THE MORNING: ONCE OR TWICE
ACT_TOTALSCORE: 18

## 2023-08-22 ASSESSMENT — PATIENT HEALTH QUESTIONNAIRE - PHQ9: SUM OF ALL RESPONSES TO PHQ QUESTIONS 1-9: 0

## 2023-08-22 NOTE — PROGRESS NOTES
Assessment & Plan     Chronic bilateral low back pain with sciatica  Patient notes a history of chronic low back pain with intermittent sciatica, alternates affected side. Patient is currently managed on gabapentin 300 mg TID and does not think it is effective enough. Notes that he used to be on a higher dose. He is not currently experiencing any drowsiness. Will increase to 600 mg TID. Does not appear that patient has had imaging of his spine; if at next visit, pain remains uncontrolled, will consider imaging and further work-up.   - gabapentin (NEURONTIN) 300 MG capsule; Take 2 capsules (600 mg) by mouth 3 times daily    Benign essential hypertension  Patient presents for hypertension follow-up. Blood pressure is not at goal today in clinic. Current medications include: losartan 25 mg, but has has not been taking it for the past month because he ran out. Will refill losartan today and recheck blood pressure in a month.   - losartan (COZAAR) 25 MG tablet; Take 1 tablet (25 mg) by mouth daily  - BMP today     Moderate persistent asthma without complication  Patient's ACT of 18 today indicates poor control, but patient notes that he has been out of his Advair for the past month. He notes that his breathing is under good control when he is taking his Advair as prescribed. Even without the Advair, only requires Albuterol weekly. Lung exam today without wheeze. Will renew medications and have patient follow-up in one month to make sure his symptoms are stable on current medication regimen.   - albuterol (PROAIR HFA/PROVENTIL HFA/VENTOLIN HFA) 108 (90 Base) MCG/ACT inhaler; Inhale 1-2 puffs into the lungs every 4 hours as needed for shortness of breath, wheezing or cough  - ADVAIR DISKUS 500-50 MCG/ACT inhaler; Inhale 1 puff into the lungs 2 times daily    Atopic dermatitis, unspecified type  Patient has history of eczema, was seen by dermatology in July of last year. Patient notes that his eczema is currently under  good control, but would like a refill of his medications today in case of flare. Skin exam without rash today.   - betamethasone valerate (VALISONE) 0.1 % external ointment; Apply twice daily as needed for rash on the extremities  - augmented betamethasone dipropionate (DIPROLENE-AF) 0.05 % external ointment; Apply twice daily as needed for rash on lower extremities for up to two weeks at a time  - tacrolimus (PROTOPIC) 0.1 % external ointment; Apply twice daily as needed for rash on lower extremities    Screening for HIV (human immunodeficiency virus)  Agrees to routine screening today.   - HIV Ag/Ab Screen Cascade    Need for hepatitis C screening test  Agrees to routine screening today.   - Hepatitis C Screen Reflex to HCV RNA Quant and Genotype    Return in about 4 weeks (around 9/19/2023).    Ceci Schmidt MD  [facility] [place] Subjective Gene is a 58 year old, presenting for the following health issues:  Recheck Medication (Pt here for medications refills. No concerns at this time. )      8/22/2023    11:00 AM   Additional Questions   Roomed by elaina   Accompanied by Self       HPI     Medication refills - moving back from [place].     Asthma  - Advair daily.   - Has been out of his Advair for a month    - Thinks that his asthma has been less well controlled over this period of time because of that.   - Albuterol as needed - uses about once a week  - ACT today - 18, but patient suspects this is due to him being out of Advair. Otherwise thinks his asthma is pretty well controlled.     Eczema   - Has been taking three creams as needed, prescribed by dermatology   - Helps control the rash when present  - Haven't had to use it for a while but would like them refilled today     Chronic back pain  - Intermittent low back pain, has been present for years.   - Takes gabapentin three times a day (300 mg). Many years ago, had been on higher doses.   - Notes that it isn't effective enough,  "wondering if he can increase the dose, which had helped in the past.   - Does report intermittent radiculopathy that can affect either side.     HTN  - Has been out of his losartan 25 mg for a month  - Has a blood pressure cuff at home at home and checks regularly, usually normal while taking the losartan.     Review of Systems   Constitutional, HEENT, cardiovascular, pulmonary, gi and gu systems are negative, except as otherwise noted above.       Objective    BP (!) 148/90   Pulse 69   Temp 98.2  F (36.8  C) (Oral)   Ht 1.88 m (6' 2\")   Wt 92.1 kg (203 lb)   SpO2 97%   BMI 26.06 kg/m    Body mass index is 26.06 kg/m .  Physical Exam   GENERAL: healthy, alert and no distress  EYES: Eyes grossly normal to inspection, PERRL and conjunctivae and sclerae normal  RESP: lungs clear to auscultation - no rales, rhonchi or wheezes  CV: regular rate and rhythm, normal S1 S2, no S3 or S4, no murmur, click or rub, no peripheral edema and peripheral pulses strong  MS: no gross musculoskeletal defects noted, no edema  BACK: no CVA tenderness, no paralumbar tenderness  PSYCH: mentation appears normal, affect normal/bright          "

## 2023-08-22 NOTE — PROGRESS NOTES
Preceptor Attestation:  Patient's case reviewed and discussed with Ceci Schmidt MD resident and I evaluated the patient. I agree with written assessment and plan of care.  Supervising Physician:  MARJORIE BERGERON MD  [facility]

## 2023-08-22 NOTE — LETTER
August 25, 2023      Andriy MAGAÑA Giovanni Coughlin  [address] [place] [address]        Dear MichelleDavila,    We are writing to inform you of your test results.    Thanks for coming back to clinic to re-establish care. At your appointment we checked routine labs including HIV and hepatitis C, both of which returned as negative, meaning you do not have the disease.     We also checked your kidney function and electrolytes. Your electrolytes came back normal, but your kidney function was slightly off. We will plan to recheck this at your next appointment. Please make that appointment to return to clinic in four weeks.     Please let our clinic know if you have further questions,   Ceci Schmidt MD     Resulted Orders   Basic metabolic panel   Result Value Ref Range    Sodium 140 136 - 145 mmol/L    Potassium 4.2 3.4 - 5.3 mmol/L    Chloride 106 98 - 107 mmol/L    Carbon Dioxide (CO2) 22 22 - 29 mmol/L    Anion Gap 12 7 - 15 mmol/L    Urea Nitrogen 19.3 8.0 - 23.0 mg/dL    Creatinine 1.32 (H) 0.67 - 1.17 mg/dL    Calcium 9.6 8.6 - 10.0 mg/dL    Glucose 96 70 - 99 mg/dL    GFR Estimate 62 >60 mL/min/1.73m2   HIV Ag/Ab Screen Cascade   Result Value Ref Range    HIV Antigen Antibody Combo Nonreactive Nonreactive      Comment:      HIV-1 p24 Ag & HIV-1/HIV-2 Ab Not Detected   Hepatitis C Screen Reflex to HCV RNA Quant and Genotype   Result Value Ref Range    Hepatitis C Antibody Nonreactive Nonreactive    Narrative    Assay performance characteristics have not been established for newborns, infants, and children.       If you have any questions or concerns, please call the clinic at the number listed above.       Sincerely,      Isa Brown MD

## 2023-08-23 LAB
ANION GAP SERPL CALCULATED.3IONS-SCNC: 12 MMOL/L (ref 7–15)
BUN SERPL-MCNC: 19.3 MG/DL (ref 8–23)
CALCIUM SERPL-MCNC: 9.6 MG/DL (ref 8.6–10)
CHLORIDE SERPL-SCNC: 106 MMOL/L (ref 98–107)
CREAT SERPL-MCNC: 1.32 MG/DL (ref 0.67–1.17)
DEPRECATED HCO3 PLAS-SCNC: 22 MMOL/L (ref 22–29)
GFR SERPL CREATININE-BSD FRML MDRD: 62 ML/MIN/1.73M2
GLUCOSE SERPL-MCNC: 96 MG/DL (ref 70–99)
HCV AB SERPL QL IA: NONREACTIVE
HIV 1+2 AB+HIV1 P24 AG SERPL QL IA: NONREACTIVE
POTASSIUM SERPL-SCNC: 4.2 MMOL/L (ref 3.4–5.3)
SODIUM SERPL-SCNC: 140 MMOL/L (ref 136–145)

## 2023-08-28 DIAGNOSIS — J45.40 MODERATE PERSISTENT ASTHMA WITHOUT COMPLICATION: ICD-10-CM

## 2023-08-28 RX ORDER — FLUTICASONE PROPIONATE AND SALMETEROL 50; 500 UG/1; UG/1
1 POWDER RESPIRATORY (INHALATION) 2 TIMES DAILY
Qty: 60 EACH | Refills: 11 | OUTPATIENT
Start: 2023-08-28

## 2023-11-08 DIAGNOSIS — I10 BENIGN ESSENTIAL HYPERTENSION: ICD-10-CM

## 2023-11-08 NOTE — TELEPHONE ENCOUNTER
"Message to physician: none    Date of last visit: 8/22/2023    Date of next visit if scheduled: none    Potassium   Date Value Ref Range Status   08/22/2023 4.2 3.4 - 5.3 mmol/L Final   12/08/2021 4.2 3.5 - 5.0 mmol/L Final   03/01/2013 4.1 3.5 - 5.0 mmol/L Final     Creatinine   Date Value Ref Range Status   08/22/2023 1.32 (H) 0.67 - 1.17 mg/dL Final   03/01/2013 1.22 0.70 - 1.30 mg/dL Final     GFR Estimate   Date Value Ref Range Status   08/22/2023 62 >60 mL/min/1.73m2 Final   03/01/2013 > 60 >60 ml/min/1.73m2 Final       BP Readings from Last 3 Encounters:   08/22/23 (!) 148/90   06/29/22 134/89   03/21/22 (!) 164/98       No results found for: \"A1C\"    Please complete refill and CLOSE ENCOUNTER.  Closing the encounter signifies the refill is complete.    "

## 2023-11-09 RX ORDER — LOSARTAN POTASSIUM 25 MG/1
25 TABLET ORAL DAILY
Qty: 30 TABLET | Refills: 0 | Status: SHIPPED | OUTPATIENT
Start: 2023-11-09 | End: 2024-01-17

## 2023-11-09 NOTE — TELEPHONE ENCOUNTER
Please call patient to help him make an appointment to have a blood pressure recheck.     Ceci Schmidt MD  [facility], PGY-3

## 2024-01-17 DIAGNOSIS — I10 BENIGN ESSENTIAL HYPERTENSION: ICD-10-CM

## 2024-01-18 RX ORDER — LOSARTAN POTASSIUM 25 MG/1
25 TABLET ORAL DAILY
Qty: 30 TABLET | Refills: 1 | Status: SHIPPED | OUTPATIENT
Start: 2024-01-18